# Patient Record
Sex: FEMALE | Race: WHITE | NOT HISPANIC OR LATINO | URBAN - METROPOLITAN AREA
[De-identification: names, ages, dates, MRNs, and addresses within clinical notes are randomized per-mention and may not be internally consistent; named-entity substitution may affect disease eponyms.]

---

## 2017-07-25 ENCOUNTER — OUTPATIENT (OUTPATIENT)
Dept: OUTPATIENT SERVICES | Facility: HOSPITAL | Age: 77
LOS: 1 days | End: 2017-07-25
Payer: MEDICARE

## 2017-07-25 PROCEDURE — 77080 DXA BONE DENSITY AXIAL: CPT | Mod: 26

## 2017-07-25 PROCEDURE — G0202: CPT | Mod: 26

## 2017-07-28 ENCOUNTER — EMERGENCY (EMERGENCY)
Facility: HOSPITAL | Age: 77
LOS: 1 days | End: 2017-07-28
Payer: MEDICARE

## 2017-07-28 PROCEDURE — 71020: CPT | Mod: 26

## 2017-07-28 PROCEDURE — 99284 EMERGENCY DEPT VISIT MOD MDM: CPT

## 2017-08-05 ENCOUNTER — TRANSCRIPTION ENCOUNTER (OUTPATIENT)
Age: 77
End: 2017-08-05

## 2017-12-09 ENCOUNTER — TRANSCRIPTION ENCOUNTER (OUTPATIENT)
Age: 77
End: 2017-12-09

## 2018-12-03 ENCOUNTER — TRANSCRIPTION ENCOUNTER (OUTPATIENT)
Age: 78
End: 2018-12-03

## 2019-05-29 ENCOUNTER — OUTPATIENT (OUTPATIENT)
Dept: OUTPATIENT SERVICES | Facility: HOSPITAL | Age: 79
LOS: 1 days | End: 2019-05-29
Payer: MEDICARE

## 2019-05-29 PROCEDURE — 71046 X-RAY EXAM CHEST 2 VIEWS: CPT | Mod: 26

## 2019-08-24 ENCOUNTER — APPOINTMENT (OUTPATIENT)
Dept: MAMMOGRAPHY | Facility: CLINIC | Age: 79
End: 2019-08-24
Payer: MEDICARE

## 2019-08-24 PROCEDURE — 77063 BREAST TOMOSYNTHESIS BI: CPT

## 2019-08-24 PROCEDURE — 77067 SCR MAMMO BI INCL CAD: CPT

## 2019-09-20 ENCOUNTER — APPOINTMENT (OUTPATIENT)
Dept: RADIOLOGY | Facility: CLINIC | Age: 79
End: 2019-09-20
Payer: MEDICARE

## 2019-09-20 PROCEDURE — 73120 X-RAY EXAM OF HAND: CPT | Mod: 50

## 2020-10-05 ENCOUNTER — APPOINTMENT (OUTPATIENT)
Dept: RADIOLOGY | Facility: CLINIC | Age: 80
End: 2020-10-05

## 2020-10-05 ENCOUNTER — APPOINTMENT (OUTPATIENT)
Dept: MAMMOGRAPHY | Facility: CLINIC | Age: 80
End: 2020-10-05
Payer: MEDICARE

## 2020-10-05 PROCEDURE — 77067 SCR MAMMO BI INCL CAD: CPT

## 2020-10-05 PROCEDURE — 77063 BREAST TOMOSYNTHESIS BI: CPT

## 2020-10-05 PROCEDURE — 77080 DXA BONE DENSITY AXIAL: CPT

## 2021-05-12 ENCOUNTER — APPOINTMENT (OUTPATIENT)
Dept: RADIOLOGY | Facility: CLINIC | Age: 81
End: 2021-05-12
Payer: MEDICARE

## 2021-05-12 PROCEDURE — 71100 X-RAY EXAM RIBS UNI 2 VIEWS: CPT | Mod: RT

## 2021-05-12 PROCEDURE — 71046 X-RAY EXAM CHEST 2 VIEWS: CPT

## 2021-08-19 ENCOUNTER — APPOINTMENT (OUTPATIENT)
Dept: MAMMOGRAPHY | Facility: CLINIC | Age: 81
End: 2021-08-19

## 2021-08-19 ENCOUNTER — APPOINTMENT (OUTPATIENT)
Dept: ULTRASOUND IMAGING | Facility: CLINIC | Age: 81
End: 2021-08-19

## 2021-08-20 ENCOUNTER — APPOINTMENT (OUTPATIENT)
Dept: BREAST CENTER | Facility: CLINIC | Age: 81
End: 2021-08-20
Payer: MEDICARE

## 2021-08-20 VITALS
WEIGHT: 160 LBS | DIASTOLIC BLOOD PRESSURE: 81 MMHG | SYSTOLIC BLOOD PRESSURE: 131 MMHG | HEART RATE: 78 BPM | TEMPERATURE: 97.5 F | BODY MASS INDEX: 25.11 KG/M2 | HEIGHT: 67 IN

## 2021-08-20 DIAGNOSIS — R93.89 ABNORMAL FINDINGS ON DIAGNOSTIC IMAGING OF OTHER SPECIFIED BODY STRUCTURES: ICD-10-CM

## 2021-08-20 DIAGNOSIS — Z87.891 PERSONAL HISTORY OF NICOTINE DEPENDENCE: ICD-10-CM

## 2021-08-20 DIAGNOSIS — Z78.9 OTHER SPECIFIED HEALTH STATUS: ICD-10-CM

## 2021-08-20 DIAGNOSIS — Z86.39 PERSONAL HISTORY OF OTHER ENDOCRINE, NUTRITIONAL AND METABOLIC DISEASE: ICD-10-CM

## 2021-08-20 PROCEDURE — 99203 OFFICE O/P NEW LOW 30 MIN: CPT

## 2021-08-20 RX ORDER — CHROMIUM 200 MCG
TABLET ORAL
Refills: 0 | Status: ACTIVE | COMMUNITY

## 2021-08-20 RX ORDER — LACTOBACILLUS ACIDOPHILUS/PECT 30 MG-20MG
TABLET ORAL
Refills: 0 | Status: ACTIVE | COMMUNITY

## 2021-08-20 RX ORDER — PNV NO.95/FERROUS FUM/FOLIC AC 28MG-0.8MG
TABLET ORAL
Refills: 0 | Status: ACTIVE | COMMUNITY

## 2021-08-20 NOTE — HISTORY OF PRESENT ILLNESS
[FreeTextEntry1] : Pt is a 81 year old white female referred for consultation by Wero Figueroa MD for L breast pink/bloody dc. Pt noticed the discharge 1 week ago. Pt states that her nipple was very itchy and started scratching it and that’s when it started to bleed. \par Pt denies any breast lesions or masses. \par \par 8/16/21, ZP, Bilat dMMG: scattered FG density, no susp findings, R subareolar 4mm nodular density, bilat benign breast calcs noted, L u/s bx rec, BR4\par 8/16/21, ZP, L U/S: 2:00 6N 1.1cm well-circumscribed hypoechoic nodule, RA duct ectasia containing a 7mm isoechoic soft tissue nodule, L u/s bx and R U/S rec, BR4\par 8/17/21, ZP, R U/S: SA nodule may rep focal duct ectasia, rec R mmg and u/s in 6 mos, BR3\par \par Fhx: mAunt w/ Lung Ca at 76, pAunt w/ Breast at 36, mUncle w/ Kidney Ca at 76, and mGrandma w/ Colon Ca at 88. \par CBE: Right negative, Left breast small abrasion from band aid and left breast single duct bloody nipple d/c, guiac positive, no axillary or SC lymphadenopathy.

## 2021-08-20 NOTE — PAST MEDICAL HISTORY
[Menarche Age ____] : age at menarche was [unfilled] [Menopause Age____] : age at menopause was [unfilled] [History of Hormone Replacement Treatment] : has a history of hormone replacement treatment [Total Preg ___] : G[unfilled] [Live Births ___] : P[unfilled]  [Living ___] : Living: [unfilled] [Age At Live Birth ___] : Age at live birth: [unfilled] [FreeTextEntry7] : yes, around 28 years [FreeTextEntry8] : no

## 2021-08-20 NOTE — DATA REVIEWED
[FreeTextEntry1] : 8/16/21, ZP, Bilat dMMG: scattered FG density, no susp findings, R subareolar 4mm nodular density, bilat benign breast calcs noted, L u/s bx rec, BR4\par 8/16/21, ZP, L U/S: 2:00 6N 1.1cm well-circumscribed hypoechoic nodule, RA duct ectasia containing a 7mm isoechoic soft tissue nodule, L u/s bx and R U/S rec, BR4\par 8/17/21, ZP, R U/S: SA nodule may rep focal duct ectasia, rec R mmg and u/s in 6 mos, BR3

## 2021-08-20 NOTE — CONSULT LETTER
[Dear  ___] : Dear  [unfilled], [Consult Letter:] : I had the pleasure of evaluating your patient, [unfilled]. [Please see my note below.] : Please see my note below. [Consult Closing:] : Thank you very much for allowing me to participate in the care of this patient.  If you have any questions, please do not hesitate to contact me. [Sincerely,] : Sincerely, [FreeTextEntry3] : Olivia Rudd MD

## 2021-08-20 NOTE — ASSESSMENT
[FreeTextEntry1] : Pt is a 81 year old female referred for consultation by Wero Figueroa MD for L breast pink/bloody dc. Pt noticed the discharge 1 week ago. Pt states that her nipple was very itchy and started scratching it and that’s when it started to bleed. \par Pt denies any breast lesions, discharge or masses. \par \par 8/16/21, ZP, Bilat dMMG: scattered FG density, no susp findings, R subareolar 4mm nodular density, bilat benign breast calcs noted, L u/s bx rec, BR4\par 8/16/21, ZP, L U/S: 2:00 6N 1.1cm well-circumscribed hypoechoic nodule, RA duct ectasia containing a 7mm isoechoic soft tissue nodule, L u/s bx, BR4\par 8/17/21, ZP, R U/S: SA nodule may rep focal duct ectasia, rec R mmg and u/s in 6 mos, BR3\par \par Fhx: mAunt w/ Lung Ca at 76, pAunt w/ Breast at 36, mUncle w/ Kidney Ca at 76, and mGrandma w/ Colon Ca at 88. \par CBE: Right negative, Left breast small abrasion from band aid and left breast single duct bloody nipple d/c, guiac positive, no axillary or SC lymphadenopathy.\par Reviewed with pt studies. Rec left breast u/s core bx and right 6 mos f.u u/s rec.  Also left breast bloody nipple and possible etiologies including papilloma, papillary carcinoma, and may correlate with the left u/s finding. If left biopsy is benign, would still consider left NAC exp and excision if bloody d/c persists. Plan for surgery pending results of the biopsy. Pt to f.u after the biopsy. Also she has a PACEMAKER and cannot have a MRI.\par RIght side rec for 6 mos f.u u/s but will also review with NW.  \par

## 2021-08-20 NOTE — PHYSICAL EXAM
[Bra Size: ___] : Bra Size: [unfilled] [Normocephalic] : normocephalic [Atraumatic] : atraumatic [Supple] : supple [No Supraclavicular Adenopathy] : no supraclavicular adenopathy [No Thyromegaly] : no thyromegaly [Examined in the supine and seated position] : examined in the supine and seated position [No dominant masses] : no dominant masses in right breast  [No dominant masses] : no dominant masses left breast [No Nipple Retraction] : no left nipple retraction [No Nipple Discharge] : no right nipple discharge [No Axillary Lymphadenopathy] : no left axillary lymphadenopathy [No Edema] : no edema [No Swelling] : no swelling [Full ROM] : full range of motion [No Rashes] : no rashes [No Ulceration] : no ulceration [de-identified] : left breast single duct bloody d/c.  High axillary tail pacemaker.

## 2021-08-23 ENCOUNTER — NON-APPOINTMENT (OUTPATIENT)
Age: 81
End: 2021-08-23

## 2021-08-27 ENCOUNTER — NON-APPOINTMENT (OUTPATIENT)
Age: 81
End: 2021-08-27

## 2021-08-31 ENCOUNTER — NON-APPOINTMENT (OUTPATIENT)
Age: 81
End: 2021-08-31

## 2021-09-02 ENCOUNTER — APPOINTMENT (OUTPATIENT)
Dept: ULTRASOUND IMAGING | Facility: CLINIC | Age: 81
End: 2021-09-02
Payer: MEDICARE

## 2021-09-02 ENCOUNTER — RESULT REVIEW (OUTPATIENT)
Age: 81
End: 2021-09-02

## 2021-09-02 PROCEDURE — 77065 DX MAMMO INCL CAD UNI: CPT | Mod: LT

## 2021-09-02 PROCEDURE — 19084Z: CUSTOM

## 2021-09-02 PROCEDURE — 19083 BX BREAST 1ST LESION US IMAG: CPT | Mod: LT

## 2021-09-02 PROCEDURE — A4648: CPT | Mod: NC

## 2021-09-08 ENCOUNTER — NON-APPOINTMENT (OUTPATIENT)
Age: 81
End: 2021-09-08

## 2021-09-09 ENCOUNTER — NON-APPOINTMENT (OUTPATIENT)
Age: 81
End: 2021-09-09

## 2021-09-13 ENCOUNTER — APPOINTMENT (OUTPATIENT)
Dept: BREAST CENTER | Facility: CLINIC | Age: 81
End: 2021-09-13
Payer: MEDICARE

## 2021-09-13 VITALS
HEIGHT: 67 IN | BODY MASS INDEX: 25.11 KG/M2 | WEIGHT: 160 LBS | HEART RATE: 87 BPM | TEMPERATURE: 97.3 F | SYSTOLIC BLOOD PRESSURE: 131 MMHG | DIASTOLIC BLOOD PRESSURE: 71 MMHG

## 2021-09-13 PROCEDURE — 99215 OFFICE O/P EST HI 40 MIN: CPT

## 2021-09-13 NOTE — CONSULT LETTER
[Dear  ___] : Dear  [unfilled], [Courtesy Letter:] : I had the pleasure of seeing your patient, [unfilled], in my office today. [Please see my note below.] : Please see my note below. [Consult Closing:] : Thank you very much for allowing me to participate in the care of this patient.  If you have any questions, please do not hesitate to contact me. [Sincerely,] : Sincerely, [FreeTextEntry3] : Olivia Rudd MD

## 2021-09-13 NOTE — DATA REVIEWED
[FreeTextEntry1] : 8/16/21, ZP, Bilat dMMG: scattered FG density, no susp findings, R subareolar 4mm nodular density, bilat benign breast calcs noted, L u/s bx rec, BR4\par 8/16/21, ZP, L U/S: 2:00 6N 1.1cm well-circumscribed hypoechoic nodule, RA duct ectasia containing a 7mm isoechoic soft tissue nodule, L u/s bx, BR4\par 8/17/21, ZP, R U/S: SA nodule may rep focal duct ectasia, rec R mmg and u/s in 6 mos, BR3\par 9/2/21, L U/S bx 12mm nodule 2:00 PATH: Focal stromal fibrosis, benign and concordant, clinical f/u is rec for bloody nipple discharge\par L U/S bx 6mm RA nodule PATH: Duct ectasia w/ periductal fibrosis, benign and concordant, clinical f/u is rec for bloody nipple discharge\par

## 2021-09-13 NOTE — PHYSICAL EXAM
[Normocephalic] : normocephalic [Atraumatic] : atraumatic [Supple] : supple [No Supraclavicular Adenopathy] : no supraclavicular adenopathy [No Thyromegaly] : no thyromegaly [Examined in the supine and seated position] : examined in the supine and seated position [No dominant masses] : no dominant masses in right breast  [No dominant masses] : no dominant masses left breast [No Nipple Retraction] : no left nipple retraction [No Nipple Discharge] : no left nipple discharge [No Axillary Lymphadenopathy] : no left axillary lymphadenopathy [No Edema] : no edema [No Swelling] : no swelling [Full ROM] : full range of motion [No Rashes] : no rashes [No Ulceration] : no ulceration [Bra Size: ___] : Bra Size: [unfilled] [de-identified] : R>L overall.  [de-identified] : Left breast ecchymosis s/p bx x2, persistent left breast single duct bloody nipple d/c upon expression

## 2021-09-13 NOTE — ASSESSMENT
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 for L breast pink/bloody dc. Pt has not had any spontaneous nipple discharge since last visit.  Here with her daughter, Carolee. \par \par Referred by Wero Figueroa MD.\par \par 8/16/21, ZP, Bilat dMMG: scattered FG density, no susp findings, R subareolar 4mm nodular density, bilat benign breast calcs noted, L u/s bx rec, BR4\par 8/16/21, ZP, L U/S: 2:00 6N 1.1cm well-circumscribed hypoechoic nodule, RA duct ectasia containing a 7mm isoechoic soft tissue nodule, L u/s bx, BR4\par 8/17/21, ZP, R U/S: SA nodule may rep focal duct ectasia, rec R mmg and u/s in 6 mos, BR3\par 9/2/21, L U/S bx 12mm nodule 2:00 PATH: Focal stromal fibrosis, benign and concordant, clinical f/u is rec for bloody nipple discharge\par L U/S bx 6mm RA nodule PATH: Duct ectasia w/ periductal fibrosis, benign and concordant, clinical f/u is rec for bloody nipple discharge\par \par Fhx: mAunt w/ Lung Ca at 76, pAunt w/ Breast at 36, mUncle w/ Kidney Ca at 76, and mGrandma w/ Colon Ca at 88. \par CBE: Right neg, Left ecchymosis from biopsy and and persistent single duct bloody nipple d/c. No adenopathy.\par Long discussion with pt and daughter. Left breast biopsies x2 benign and concordant but no evidence of papilloma on either biopsy. Also still has persistent single duct bloody nipple d/c.  \par Also [facility]AD showed BRCA2+ and pt has call into genetic counseling. Reviewed risk of breast cancer 43-84% up to age 70.  Discussed pt is 81 and risk of breast cancer also increases with age.  Screening for BRCA 2+ pts is mmg and u/s alternating with MRI usually unless prophylactic mastectomies.  Limited data for breast cancer risk and screening for >70's y/o.\par Rec is for MRI if doable. Pt will need a letter from cardiologist.  If MRI is done, then further management pending results.\par If MRI is negative or not doable, then options are: NAC exp and excision with further management pending results including additional surgery or given BRCA 2+, prophylactic mastectomies with or without recon. Will discuss need for SLN given age with medical oncologist prior to proceeding. \par Would rec appt with plastic surgeon if this option is a consideration and thinking about reconstruction. \par  BRCA 2+ also incurs risk for ovarian cancer, rec appt with gyn and also melanoma, pt thinks has had in the past. \par Pt states that she is able to get MRI with her pacemaker type.  \par

## 2021-09-13 NOTE — HISTORY OF PRESENT ILLNESS
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 Pt has not had any spontaneous nipple discharge since last visit.  Here with her daughter, Carolee. \par \par Referred by Wero Figueroa MD.\par \par 8/16/21, ZP, Bilat dMMG: scattered FG density, no susp findings, R subareolar 4mm nodular density, bilat benign breast calcs noted, L u/s bx rec, BR4\par 8/16/21, ZP, L U/S: 2:00 6N 1.1cm well-circumscribed hypoechoic nodule, RA duct ectasia containing a 7mm isoechoic soft tissue nodule, L u/s bx, BR4\par 8/17/21, ZP, R U/S: SA nodule may rep focal duct ectasia, rec R mmg and u/s in 6 mos, BR3\par \par 9/2/21, L U/S bx 12mm nodule 2:00 PATH: Focal stromal fibrosis, benign and concordant, clinical f/u is rec for bloody nipple discharge\par L U/S bx 6mm RA nodule PATH: Duct ectasia w/ periductal fibrosis, benign and concordant, clinical f/u is rec for bloody nipple discharge\par \par Fhx: mAunt w/ Lung Ca at 76, pAunt w/ Breast at 36, mUncle w/ Kidney Ca at 76, and mGrandma w/ Colon Ca at 88. \par \par  \par \par 
no...

## 2021-09-20 ENCOUNTER — NON-APPOINTMENT (OUTPATIENT)
Age: 81
End: 2021-09-20

## 2021-09-21 ENCOUNTER — APPOINTMENT (OUTPATIENT)
Dept: OBGYN | Facility: CLINIC | Age: 81
End: 2021-09-21
Payer: MEDICARE

## 2021-09-21 VITALS
HEIGHT: 67 IN | BODY MASS INDEX: 25.11 KG/M2 | WEIGHT: 160 LBS | SYSTOLIC BLOOD PRESSURE: 122 MMHG | DIASTOLIC BLOOD PRESSURE: 74 MMHG

## 2021-09-21 DIAGNOSIS — R92.8 OTHER ABNORMAL AND INCONCLUSIVE FINDINGS ON DIAGNOSTIC IMAGING OF BREAST: ICD-10-CM

## 2021-09-21 DIAGNOSIS — Z15.01 GENETIC SUSCEPTIBILITY TO MALIGNANT NEOPLASM OF BREAST: ICD-10-CM

## 2021-09-21 DIAGNOSIS — Z87.2 PERSONAL HISTORY OF DISEASES OF THE SKIN AND SUBCUTANEOUS TISSUE: ICD-10-CM

## 2021-09-21 DIAGNOSIS — Z87.39 PERSONAL HISTORY OF OTHER DISEASES OF THE MUSCULOSKELETAL SYSTEM AND CONNECTIVE TISSUE: ICD-10-CM

## 2021-09-21 DIAGNOSIS — Z15.09 GENETIC SUSCEPTIBILITY TO MALIGNANT NEOPLASM OF BREAST: ICD-10-CM

## 2021-09-21 PROCEDURE — 99387 INIT PM E/M NEW PAT 65+ YRS: CPT

## 2021-09-21 PROCEDURE — 99202 OFFICE O/P NEW SF 15 MIN: CPT | Mod: 25

## 2021-09-21 RX ORDER — COLD-HOT PACK
EACH MISCELLANEOUS
Refills: 0 | Status: ACTIVE | COMMUNITY

## 2021-09-21 RX ORDER — ALPRAZOLAM 0.25 MG/1
0.25 TABLET ORAL
Qty: 90 | Refills: 0 | Status: ACTIVE | COMMUNITY
Start: 2021-08-27

## 2021-09-21 NOTE — HISTORY OF PRESENT ILLNESS
[Patient reported mammogram was abnormal] : Patient reported mammogram was abnormal [Patient reported bone density results were abnormal] : Patient reported bone density results were abnormal [Patient reported colonoscopy was normal] : Patient reported colonoscopy was normal [FreeTextEntry1] : 82yo  with ductal ectasia and BRCA2 + followed by Dr Rudd is here for GYN intake.   [Mammogramdate] : 2021 [TextBox_19] : ductal ectasia [PapTaraeardate] : 10yrs ago  [TextBox_31] : denies abnl paps [BoneDensityDate] : 2 yrs ago  [TextBox_37] : osteopenia  [ColonoscopyDate] : 2 yrs ago  [TextBox_43] : per pateint report

## 2021-09-21 NOTE — PHYSICAL EXAM
[Appropriately responsive] : appropriately responsive [Alert] : alert [No Acute Distress] : no acute distress [No Lymphadenopathy] : no lymphadenopathy [Regular Rate Rhythm] : regular rate rhythm [No Murmurs] : no murmurs [Clear to Auscultation B/L] : clear to auscultation bilaterally [Soft] : soft [Non-tender] : non-tender [Non-distended] : non-distended [No HSM] : No HSM [No Lesions] : no lesions [No Mass] : no mass [Oriented x3] : oriented x3 [FreeTextEntry6] : Pt has bruising on her L breast from her bx.  [Examination Of The Breasts] : a normal appearance [___] : a [unfilled] ~Ucm area of erythema [No Masses] : no breast masses were palpable [Labia Majora] : normal [Labia Minora] : normal [Normal] : normal [Uterine Adnexae] : normal [FreeTextEntry9] : Neg

## 2021-09-21 NOTE — DISCUSSION/SUMMARY
[FreeTextEntry1] : 80yo P2 with ductal ectasia and BRCA2 + followed by Dr Rudd \par \par BRCA 2+: We discussed increased r/o breast and ovarian ca and offered pt ppx oophorectomy. Pt would like to have close follow up for now \par - RTO ASAP for TVUS\par - q 6 month pelvic exam \par - Will address surgery with her at each viist \par - Daughter is BRCA tested and neg 10 yrs ago but son is not. Recommended they both get tested\par \par Vag atrophy \par - Pt to follow up with Dr Rudd re: restarting vaginal E2 for recurrent UTIs \par \par RHM: \par - DVS neg x 3\par - Dentist, PCP, Derm as discussed \par - Rx given for DEXA\par - Colonoscopy as discussed -- due in 3 yrs \par - Encouraged healthy diet, exercise, weight maint. \par \par Ann Camacho MD \par Obstetrician/Gynecologist\par \par

## 2021-09-21 NOTE — COUNSELING
[Nutrition/ Exercise/ Weight Management] : nutrition, exercise, weight management [Drugs/Alcohol] : drugs, alcohol [Intimate Partner Violence] : intimate partner violence [STD (testing, results, tx)] : STD (testing, results, tx)

## 2021-09-27 ENCOUNTER — NON-APPOINTMENT (OUTPATIENT)
Age: 81
End: 2021-09-27

## 2021-10-06 ENCOUNTER — OUTPATIENT (OUTPATIENT)
Dept: OUTPATIENT SERVICES | Facility: HOSPITAL | Age: 81
LOS: 1 days | End: 2021-10-06
Payer: MEDICARE

## 2021-10-06 ENCOUNTER — APPOINTMENT (OUTPATIENT)
Dept: MRI IMAGING | Facility: HOSPITAL | Age: 81
End: 2021-10-06

## 2021-10-06 DIAGNOSIS — N64.52 NIPPLE DISCHARGE: ICD-10-CM

## 2021-10-06 DIAGNOSIS — Z15.01 GENETIC SUSCEPTIBILITY TO MALIGNANT NEOPLASM OF BREAST: ICD-10-CM

## 2021-10-06 LAB
CYTOLOGY CVX/VAG DOC THIN PREP: NORMAL
HPV HIGH+LOW RISK DNA PNL CVX: NOT DETECTED

## 2021-10-06 PROCEDURE — A9585: CPT

## 2021-10-06 PROCEDURE — 71046 X-RAY EXAM CHEST 2 VIEWS: CPT | Mod: 26

## 2021-10-06 PROCEDURE — 77049 MRI BREAST C-+ W/CAD BI: CPT | Mod: 26

## 2021-10-06 PROCEDURE — C8908: CPT

## 2021-10-06 PROCEDURE — 71046 X-RAY EXAM CHEST 2 VIEWS: CPT

## 2021-10-06 PROCEDURE — C8937: CPT

## 2021-10-08 ENCOUNTER — NON-APPOINTMENT (OUTPATIENT)
Age: 81
End: 2021-10-08

## 2021-10-13 ENCOUNTER — APPOINTMENT (OUTPATIENT)
Dept: BREAST CENTER | Facility: CLINIC | Age: 81
End: 2021-10-13
Payer: MEDICARE

## 2021-10-13 VITALS
DIASTOLIC BLOOD PRESSURE: 97 MMHG | WEIGHT: 160 LBS | HEIGHT: 67 IN | BODY MASS INDEX: 25.11 KG/M2 | SYSTOLIC BLOOD PRESSURE: 167 MMHG | HEART RATE: 81 BPM

## 2021-10-13 DIAGNOSIS — Z72.3 LACK OF PHYSICAL EXERCISE: ICD-10-CM

## 2021-10-13 DIAGNOSIS — Z80.3 FAMILY HISTORY OF MALIGNANT NEOPLASM OF BREAST: ICD-10-CM

## 2021-10-13 DIAGNOSIS — Z80.1 FAMILY HISTORY OF MALIGNANT NEOPLASM OF TRACHEA, BRONCHUS AND LUNG: ICD-10-CM

## 2021-10-13 DIAGNOSIS — Z80.0 FAMILY HISTORY OF MALIGNANT NEOPLASM OF DIGESTIVE ORGANS: ICD-10-CM

## 2021-10-13 DIAGNOSIS — Z87.891 PERSONAL HISTORY OF NICOTINE DEPENDENCE: ICD-10-CM

## 2021-10-13 DIAGNOSIS — Z80.51 FAMILY HISTORY OF MALIGNANT NEOPLASM OF KIDNEY: ICD-10-CM

## 2021-10-13 DIAGNOSIS — Z81.8 FAMILY HISTORY OF OTHER MENTAL AND BEHAVIORAL DISORDERS: ICD-10-CM

## 2021-10-13 PROCEDURE — 99417 PROLNG OP E/M EACH 15 MIN: CPT

## 2021-10-13 PROCEDURE — 99205 OFFICE O/P NEW HI 60 MIN: CPT

## 2021-10-13 PROCEDURE — G2212 PROLONG OUTPT/OFFICE VIS: CPT

## 2021-10-13 RX ORDER — ATORVASTATIN CALCIUM 10 MG/1
10 TABLET, FILM COATED ORAL
Refills: 0 | Status: ACTIVE | COMMUNITY

## 2021-10-13 RX ORDER — METOPROLOL SUCCINATE 25 MG/1
25 TABLET, EXTENDED RELEASE ORAL
Refills: 0 | Status: ACTIVE | COMMUNITY

## 2021-10-13 RX ORDER — MAGNESIUM OXIDE/MAG AA CHELATE 300 MG
CAPSULE ORAL
Refills: 0 | Status: ACTIVE | COMMUNITY

## 2021-10-13 RX ORDER — MV,IRON,MINS/FOLIC ACID/BIOTIN 66.7 MCG
CAPSULE ORAL
Refills: 0 | Status: ACTIVE | COMMUNITY

## 2021-10-13 NOTE — HISTORY OF PRESENT ILLNESS
[FreeTextEntry1] : This is an 81 year old  female who had an episode of itching of her left nipple.  She scratched and saw bleeding.  She saw Dr. Rudd and it was found that she had bloody nipple discharge from a single duct.  She had a workup with a mammogram and ultrasound.  Two lesions were seen on the left ultrasound.  Biopsies were benign.  She then had an MRI that was normal. The patient has not seen any further bleeding herself.\par \par Dr. Rudd also sent of genetic testing since there was a family history of breast cancer at age 34 in a paternal aunt.  The patient was found to carry a BRCA2 mutation.  \par \par She has been advised to have either a left breast biopsy or bilateral mastectomies.  She was sent to Dr. Duran for the ovarian cancer risk and has a pelvic ultrasound scheduled.\par \par She had a benign needle biopsy of one of her breasts 40 years ago.\par \par She is here for an opinion because I took care of her neighbor.

## 2021-10-13 NOTE — PHYSICAL EXAM
[EOMI] : extra ocular movement intact [Sclera nonicteric] : sclera nonicteric [Supple] : supple [No Supraclavicular Adenopathy] : no supraclavicular adenopathy [No Cervical Adenopathy] : no cervical adenopathy [No Thyromegaly] : no thyromegaly [Clear to Auscultation Bilat] : clear to auscultation bilaterally [Normal Sinus Rhythm] : normal sinus rhythm [Normal S1, S2] : normal S1 and S2 [Examined in the supine and seated position] : examined in the supine and seated position [No dominant masses] : no dominant masses in right breast  [No dominant masses] : no dominant masses left breast [No Nipple Retraction] : no left nipple retraction [No Nipple Discharge] : no left nipple discharge [No Axillary Lymphadenopathy] : no left axillary lymphadenopathy [Soft] : abdomen soft [Not Tender] : non-tender [No Palpable Masses] : no abdominal mass palpated [de-identified] : Pacemaker left [de-identified] : No discharge could be ellicited

## 2021-10-13 NOTE — CONSULT LETTER
[Dear  ___] : Dear  [unfilled], [Consult Letter:] : I had the pleasure of evaluating your patient, [unfilled]. [DrAndrew  ___] : Dr. TORRES

## 2021-10-13 NOTE — DATA REVIEWED
[FreeTextEntry1] : Bilateral mammogram 8/16/2021:  Right 4 mm subareolar density.\par \par Left breast ultrasound 8/16/2021:  2:00 N6 1.1 cm well circumscribed hypoechoic nodule.  retroareolar duct ectasia containing 7 mm isoechoic nodule.  Biopsies advised.\par \par Right breast ultrasound 8/17/2021:  Subareolar nodule may represent a focal duct ectasia.  Follow-up in 6 months.\par \par Pathology 9/2/2021:  Left 2N6= benign fibroadipose tissue with focal stromal fibrosis\par                                   Left RA= benign breast tissue showing duct ectasia with periductal fibrosis\par \par Bilateral breast MRI 10/6/2021:  No MRI evidence of malignancy. Bilaterally mildly dilated retroareolar ducts with inspissated debris.\par \par (Images reviewed on PACS.)

## 2021-10-13 NOTE — PAST MEDICAL HISTORY
[Postmenopausal] : The patient is postmenopausal [Menarche Age ____] : age at menarche was [unfilled] [Total Preg ___] : G[unfilled] [Live Births ___] : P[unfilled]  [Age At Live Birth ___] : Age at live birth: [unfilled] [FreeTextEntry2] : daughter, son

## 2021-10-18 ENCOUNTER — APPOINTMENT (OUTPATIENT)
Dept: BREAST CENTER | Facility: CLINIC | Age: 81
End: 2021-10-18
Payer: MEDICARE

## 2021-10-18 VITALS
WEIGHT: 160 LBS | BODY MASS INDEX: 25.11 KG/M2 | TEMPERATURE: 97.6 F | DIASTOLIC BLOOD PRESSURE: 94 MMHG | HEIGHT: 67 IN | SYSTOLIC BLOOD PRESSURE: 166 MMHG | HEART RATE: 80 BPM

## 2021-10-18 DIAGNOSIS — Z80.3 FAMILY HISTORY OF MALIGNANT NEOPLASM OF BREAST: ICD-10-CM

## 2021-10-18 PROCEDURE — 99215 OFFICE O/P EST HI 40 MIN: CPT

## 2021-10-18 NOTE — ASSESSMENT
[FreeTextEntry1] : Pt is a 81 year old white BRCA2+ female here for f/u and further discussion after biopsies and MRI for L breast pink/bloody dc. Pt has not had any spontaneous nipple discharge since last visit. \par Here with her daughter, Carolee and Granddaughter Lillian. \par Pt had second opinion with Dr. Griffin and is here to discuss further management. \par \par Referred by Wero Figueroa MD.\par \par 8/16/21, ZP, Bilat dMMG: scattered FG density, no susp findings, R subareolar 4mm nodular density, bilat benign breast calcs noted, L u/s bx rec, BR4\par 8/16/21, ZP, L U/S: 2:00 6N 1.1cm well-circumscribed hypoechoic nodule, RA duct ectasia containing a 7mm isoechoic soft tissue nodule, L u/s bx, BR4\par 8/17/21, ZP, R U/S: SA nodule may rep focal duct ectasia, rec R mmg and u/s in 6 mos, BR3\par 9/2/21, L U/S bx 12mm nodule 2:00 PATH: Focal stromal fibrosis, benign and concordant, clinical f/u is rec for bloody nipple discharge\par L U/S bx 6mm RA nodule PATH: Duct ectasia w/ periductal fibrosis, benign and concordant, clinical f/u is rec for bloody nipple discharge\par 10/6/21, [facility], MRI: bilaterally - multiple mildly dilated RA ducts containing most c/w inspissated/proteinaceous debris within the ducts, few scattered enhancing nonspecific foci bilat, no susp enhancement bilat, L CW pacemaker, no axillary or internal mammary lymphadenopathy, no MRI evidence of malignancy, further mgmt for the pt's L bloody d/c must be on a clinical basis, rec annual mmg on schedule, BR2.\par \par Fhx: mAunt w/ Lung Ca at 76, pAunt w/ Breast at 36, mUncle w/ Kidney Ca at 76, and mGrandma w/ Colon Ca at 88. \par CBE: Right neg, Left single duct bloody nipple d/c. No adenopathy. Full ROM and no lymphedema.\par Reviewed results of biopsy and studies. Discussed her issues:  Left bloody nipple d/c, would rec NAC exp and excision with or without mastectomy (for BRCA2+).  Further management pending results, if malignancy is found, may need delayed additional surgery, including re-excision, possible LN surgery. \par Also discussed BRCA2+, options would be alternating mmg/u/s with MRI and CBE q 6 mos vs bilat mastectomy with or without recon. Discussed if mastectomy is done, would place magtrace and pending results may determine if SLNBx is needed. \par \par Right breast, no suspicious findings but rec was for Right f.u mmg and u/s 2/22.  Would not need if pt had mastectomies but would place magtrace. \par Pt does not want mastectomy and opts for NAC exploration and exc. She also has an appt with Dr. Duran to discuss the BSO. She has appt for pelvic u/s. \par Pt will see Dr Duran and contact us for possible coordination of the breast and gyn surgeries. Reviewed risk and complications, including but not limited to infection, discussed my part is outpt, pain control, f.u etc. \par \par

## 2021-10-18 NOTE — PHYSICAL EXAM
[Normocephalic] : normocephalic [Atraumatic] : atraumatic [Supple] : supple [No Supraclavicular Adenopathy] : no supraclavicular adenopathy [No Thyromegaly] : no thyromegaly [Examined in the supine and seated position] : examined in the supine and seated position [No dominant masses] : no dominant masses in right breast  [No dominant masses] : no dominant masses left breast [No Nipple Discharge] : no right nipple discharge [No Axillary Lymphadenopathy] : no left axillary lymphadenopathy [No Edema] : no edema [No Swelling] : no swelling [Full ROM] : full range of motion [No Rashes] : no rashes [No Ulceration] : no ulceration [Symmetrical] : symmetrical [Bra Size: ___] : Bra Size: [unfilled] [No Nipple Retraction] : no left nipple retraction [de-identified] : dark blood single duct bloody d/c with expression.

## 2021-10-18 NOTE — HISTORY OF PRESENT ILLNESS
[FreeTextEntry1] : Pt is a 81 year old white BRCA2+ female here for f/u and further discussion after biopsies and MRI for L breast pink/bloody dc. Pt has not had any spontaneous nipple discharge since last visit. \par Here with her daughter, Carolee and Granddaughter Lillian. \par Pt had second opinion with Dr. Griffin and is here to discuss further management. \par \par Referred by Wero Figueroa MD.\par \par 8/16/21, ZP, Bilat dMMG: scattered FG density, no susp findings, R subareolar 4mm nodular density, bilat benign breast calcs noted, L u/s bx rec, BR4\par 8/16/21, ZP, L U/S: 2:00 6N 1.1cm well-circumscribed hypoechoic nodule, RA duct ectasia containing a 7mm isoechoic soft tissue nodule, L u/s bx, BR4\par 8/17/21, ZP, R U/S: SA nodule may rep focal duct ectasia, rec R mmg and u/s in 6 mos, BR3\par 9/2/21, L U/S bx 12mm nodule 2:00 PATH: Focal stromal fibrosis, benign and concordant, clinical f/u is rec for bloody nipple discharge\par L U/S bx 6mm RA nodule PATH: Duct ectasia w/ periductal fibrosis, benign and concordant, clinical f/u is rec for bloody nipple discharge\par 10/6/21, [facility], MRI: bilaterally - multiple mildly dilated RA ducts containing most c/w inspissated/proteinaceous debris within the ducts, few scattered enhancing nonspecific foci bilat, no susp enhancement bilat, L CW pacemaker, no axillary or internal mammary lymphadenopathy, no MRI evidence of malignancy, further mgmt for the pt's L bloody d/c must be on a clinical basis, rec annual mmg on schedule, BR2.\par \par Fhx: mAunt w/ Lung Ca at 76, pAunt w/ Breast at 36, mUncle w/ Kidney Ca at 76, and mGrandma w/ Colon Ca at 88. \par \par \par

## 2021-10-18 NOTE — DATA REVIEWED
[FreeTextEntry1] : 8/16/21, ZP, Bilat dMMG: scattered FG density, no susp findings, R subareolar 4mm nodular density, bilat benign breast calcs noted, L u/s bx rec, BR4\par 8/16/21, ZP, L U/S: 2:00 6N 1.1cm well-circumscribed hypoechoic nodule, RA duct ectasia containing a 7mm isoechoic soft tissue nodule, L u/s bx, BR4\par 8/17/21, ZP, R U/S: SA nodule may rep focal duct ectasia, rec R mmg and u/s in 6 mos, BR3\par 9/2/21, L U/S bx 12mm nodule 2:00 PATH: Focal stromal fibrosis, benign and concordant, clinical f/u is rec for bloody nipple discharge\par L U/S bx 6mm RA nodule PATH: Duct ectasia w/ periductal fibrosis, benign and concordant, clinical f/u is rec for bloody nipple discharge\par 10/6/21, [facility], MRI: bilaterally - multiple mildly dilated RA ducts containing most c/w inspissated/proteinaceous debris within the ducts, few scattered enhancing nonspecific foci bilat, no susp enhancement bilat, L CW pacemaker, no axillary or internal mammary lymphadenopathy, no MRI evidence of malignancy, further mgmt for the pt's L bloody d/c must be on a clinical basis, rec annual mmg on schedule, BR2.

## 2021-10-22 ENCOUNTER — ASOB RESULT (OUTPATIENT)
Age: 81
End: 2021-10-22

## 2021-10-22 ENCOUNTER — APPOINTMENT (OUTPATIENT)
Dept: OBGYN | Facility: CLINIC | Age: 81
End: 2021-10-22
Payer: MEDICARE

## 2021-10-22 VITALS
SYSTOLIC BLOOD PRESSURE: 118 MMHG | BODY MASS INDEX: 25.11 KG/M2 | WEIGHT: 160 LBS | DIASTOLIC BLOOD PRESSURE: 74 MMHG | HEIGHT: 67 IN

## 2021-10-22 DIAGNOSIS — N83.202 UNSPECIFIED OVARIAN CYST, LEFT SIDE: ICD-10-CM

## 2021-10-22 PROCEDURE — 76830 TRANSVAGINAL US NON-OB: CPT

## 2021-10-22 PROCEDURE — 99212 OFFICE O/P EST SF 10 MIN: CPT

## 2021-10-22 PROCEDURE — 76856 US EXAM PELVIC COMPLETE: CPT | Mod: 59

## 2021-10-29 ENCOUNTER — NON-APPOINTMENT (OUTPATIENT)
Age: 81
End: 2021-10-29

## 2021-10-29 LAB
CA 125 (LABCORP): 14.7 U/ML
EER MALIGNANCY ASSESSMENT, OVA1 PLUS: NORMAL
HE4X: 91 PMOL/L
MALIGNANCY ASSESSMENT, CA 125 II: 15
MALIGNANCY ASSESSMENT, MENOPAUSAL STATUS: NORMAL
MALIGNANCY ASSESSMENT, OVA1 SCORE: 4.4
MALIGNANCY ASSESSMENT, OVERA SCORE: 4.3
POSTMENOPAUSAL ROMA: 1.93
PREMENOPAUSAL ROMA: 2.51
ROMA COMMENT: NORMAL

## 2021-10-29 NOTE — DISCUSSION/SUMMARY
[FreeTextEntry1] : 80yo  with ductal ectasia and BRCA2 + followed by Dr Rudd with 2 L ov cysts. \par \par L ov cysts: \par - JOLENE and Ova 1 sent today \par - Discussed BSO given BRCA 2+ status.  We discussed the R/B/A of the procedure including but not limited to bleeding, infection incomplete/need for repeat/need for further procedure, injury to surrounding organs (bowel, bladder, uterus, tubes, ovaries, cervix), risk associated with anesthesia. Pt to obtain pre-operative surgical clearance from her PCP ASAP. We discussed post op care, expectations  and precautions as well.\par - Will coordinate with Dr Rudd re: surgical timing. \par \par Ann Camacho MD \par Obstetrician/Gynecologist\par

## 2021-10-29 NOTE — HISTORY OF PRESENT ILLNESS
[FreeTextEntry1] : 82yo  with ductal ectasia and BRCA2 + followed by Dr Rudd is here for GYN intake. She had a sono today which demonstrates a 3.2 mm EMS, a 1cm CARLYN fibroid and 2 L ov cysts (2 x 3cm & 2 x 1 x 1cm).

## 2021-11-24 ENCOUNTER — APPOINTMENT (OUTPATIENT)
Dept: RADIOLOGY | Facility: CLINIC | Age: 81
End: 2021-11-24

## 2021-11-30 ENCOUNTER — OUTPATIENT (OUTPATIENT)
Dept: OUTPATIENT SERVICES | Facility: HOSPITAL | Age: 81
LOS: 1 days | End: 2021-11-30
Payer: MEDICARE

## 2021-11-30 PROCEDURE — 93010 ELECTROCARDIOGRAM REPORT: CPT

## 2021-11-30 PROCEDURE — 71046 X-RAY EXAM CHEST 2 VIEWS: CPT | Mod: 26

## 2021-12-05 DIAGNOSIS — Z01.818 ENCOUNTER FOR OTHER PREPROCEDURAL EXAMINATION: ICD-10-CM

## 2021-12-11 ENCOUNTER — APPOINTMENT (OUTPATIENT)
Dept: DISASTER EMERGENCY | Facility: CLINIC | Age: 81
End: 2021-12-11

## 2021-12-12 LAB — SARS-COV-2 N GENE NPH QL NAA+PROBE: NOT DETECTED

## 2021-12-14 ENCOUNTER — APPOINTMENT (OUTPATIENT)
Dept: OBGYN | Facility: HOSPITAL | Age: 81
End: 2021-12-14

## 2021-12-14 ENCOUNTER — OUTPATIENT (OUTPATIENT)
Dept: OUTPATIENT SERVICES | Facility: HOSPITAL | Age: 81
LOS: 1 days | End: 2021-12-14
Payer: MEDICARE

## 2021-12-14 ENCOUNTER — APPOINTMENT (OUTPATIENT)
Dept: BREAST CENTER | Facility: HOSPITAL | Age: 81
End: 2021-12-14

## 2021-12-14 PROCEDURE — 19110 NIPPLE EXPLORATION: CPT

## 2021-12-14 PROCEDURE — 58661 LAPAROSCOPY REMOVE ADNEXA: CPT

## 2021-12-14 PROCEDURE — 58661 LAPAROSCOPY REMOVE ADNEXA: CPT | Mod: 80

## 2021-12-22 ENCOUNTER — APPOINTMENT (OUTPATIENT)
Dept: BREAST CENTER | Facility: CLINIC | Age: 81
End: 2021-12-22
Payer: MEDICARE

## 2021-12-22 VITALS
OXYGEN SATURATION: 95 % | TEMPERATURE: 97.9 F | HEART RATE: 80 BPM | DIASTOLIC BLOOD PRESSURE: 86 MMHG | WEIGHT: 165.01 LBS | BODY MASS INDEX: 25.84 KG/M2 | SYSTOLIC BLOOD PRESSURE: 139 MMHG

## 2021-12-22 DIAGNOSIS — N64.52 NIPPLE DISCHARGE: ICD-10-CM

## 2021-12-22 PROCEDURE — 99024 POSTOP FOLLOW-UP VISIT: CPT

## 2021-12-22 NOTE — HISTORY OF PRESENT ILLNESS
[FreeTextEntry1] : Pt is a 81 year old white BRCA2+ female here post op 12/14/21 L NAC exploration and excision coordinated w/ Dr. Duran for BSO, after biopsies and MRI for L breast pink/bloody dc. \par Doing well postop. \par 12/14/21 path is still pending. \par Here with her , Mushtaq, who is scheduled for surgery 1/20, Watchman's procedure. \par Referred by Wero Figueroa MD.\par \par 8/16/21, [facility], Bilat dMMG: scattered FG density, no susp findings, R subareolar 4mm nodular density, bilat benign breast calcs noted, L u/s bx rec, BR4\par 8/16/21, [facility], L U/S: 2:00 6N 1.1cm well-circumscribed hypoechoic nodule, RA duct ectasia containing a 7mm isoechoic soft tissue nodule, L u/s bx, BR4\par 8/17/21, [facility], R U/S: SA nodule may rep focal duct ectasia, rec R mmg and u/s in 6 mos, BR3\par 9/2/21, L U/S bx 12mm nodule 2:00 PATH: Focal stromal fibrosis, benign and concordant, clinical f/u is rec for bloody nipple discharge\par L U/S bx 6mm RA nodule PATH: Duct ectasia w/ periductal fibrosis, benign and concordant, clinical f/u is rec for bloody nipple discharge\par 10/6/21, [facility], MRI: bilaterally - multiple mildly dilated RA ducts containing most c/w inspissated/proteinaceous debris within the ducts, few scattered enhancing nonspecific foci bilat, no susp enhancement bilat, L CW pacemaker, no axillary or internal mammary lymphadenopathy, no MRI evidence of malignancy, further mgmt for the pt's L bloody d/c must be on a clinical basis, rec annual mmg on schedule, BR2.\par \par Fhx: mAunt w/ Lung Ca at 76, pAunt w/ Breast at 36, mUncle w/ Kidney Ca at 76, and mGrandma w/ Colon Ca at 88. \par

## 2021-12-22 NOTE — ASSESSMENT
[FreeTextEntry1] : Pt is a 81 year old white BRCA2+ female here post op 12/14/21 L NAC exploration and excision coordinated w/ Dr. Duran for BSO, after biopsies and MRI for L breast pink/bloody dc. \par Doing well postop. \par 12/14/21 path is still pending. \par Here with her , Mushtaq, who is scheduled for surgery 1/20, Watchman's procedure. \par Referred by Wero Figueroa MD.\par \par 8/16/21, ZP, Bilat dMMG: scattered FG density, no susp findings, R subareolar 4mm nodular density, bilat benign breast calcs noted, L u/s bx rec, BR4\par 8/16/21, ZP, L U/S: 2:00 6N 1.1cm well-circumscribed hypoechoic nodule, RA duct ectasia containing a 7mm isoechoic soft tissue nodule, L u/s bx, BR4\par 8/17/21, ZP, R U/S: SA nodule may rep focal duct ectasia, rec R mmg and u/s in 6 mos, BR3\par 9/2/21, L U/S bx 12mm nodule 2:00 PATH: Focal stromal fibrosis, benign and concordant, clinical f/u is rec for bloody nipple discharge\par L U/S bx 6mm RA nodule PATH: Duct ectasia w/ periductal fibrosis, benign and concordant, clinical f/u is rec for bloody nipple discharge\par 10/6/21, [facility], MRI: bilaterally - multiple mildly dilated RA ducts containing most c/w inspissated/proteinaceous debris within the ducts, few scattered enhancing nonspecific foci bilat, no susp enhancement bilat, L CW pacemaker, no axillary or internal mammary lymphadenopathy, no MRI evidence of malignancy, further mgmt for the pt's L bloody d/c must be on a clinical basis, rec annual mmg on schedule, BR2.\par \par Fhx: mAunt w/ Lung Ca at 76, pAunt w/ Breast at 36, mUncle w/ Kidney Ca at 76, and mGrandma w/ Colon Ca at 88. \par \par CBE: Left PA inc intact and healing.\par Discussed path is still pending. Management and f.u pending results. Will contact pt next week with results. If neg, then f.u 6 weeks, if abnl then as indicated.

## 2022-01-06 ENCOUNTER — APPOINTMENT (OUTPATIENT)
Dept: OBGYN | Facility: CLINIC | Age: 82
End: 2022-01-06
Payer: MEDICARE

## 2022-01-06 VITALS
BODY MASS INDEX: 25.11 KG/M2 | WEIGHT: 160 LBS | HEIGHT: 67 IN | SYSTOLIC BLOOD PRESSURE: 118 MMHG | DIASTOLIC BLOOD PRESSURE: 72 MMHG

## 2022-01-06 PROCEDURE — 99024 POSTOP FOLLOW-UP VISIT: CPT

## 2022-01-06 NOTE — HISTORY OF PRESENT ILLNESS
[FreeTextEntry1] : 80yo BRCA 2+ F s/p EUA, laparoscopic BSO with benign results is here for follow up.  She has had no issues since her surgery and feels great!

## 2022-01-06 NOTE — PHYSICAL EXAM
[FreeTextEntry7] : incisions are well healed  [Labia Majora] : normal [Labia Minora] : normal [Normal] : normal [Uterine Adnexae] : normal

## 2022-01-06 NOTE — DISCUSSION/SUMMARY
[FreeTextEntry1] : 80yo BRCA 2+ F s/p EUA, laparoscopic BSO with benign results \par \par - Pt ok to resume all life activities \par - Continue follow up with Dr Rudd \par - RTO in Sept for annual exam \par \par Ann Camacho MD \par Obstetrician/Gynecologist\par

## 2022-01-27 ENCOUNTER — NON-APPOINTMENT (OUTPATIENT)
Age: 82
End: 2022-01-27

## 2022-02-01 ENCOUNTER — APPOINTMENT (OUTPATIENT)
Dept: MAMMOGRAPHY | Facility: CLINIC | Age: 82
End: 2022-02-01

## 2022-02-01 ENCOUNTER — APPOINTMENT (OUTPATIENT)
Dept: ULTRASOUND IMAGING | Facility: CLINIC | Age: 82
End: 2022-02-01

## 2022-06-01 ENCOUNTER — RESULT REVIEW (OUTPATIENT)
Age: 82
End: 2022-06-01

## 2022-06-23 ENCOUNTER — APPOINTMENT (OUTPATIENT)
Dept: MAMMOGRAPHY | Facility: CLINIC | Age: 82
End: 2022-06-23

## 2022-06-23 ENCOUNTER — RESULT REVIEW (OUTPATIENT)
Age: 82
End: 2022-06-23

## 2022-06-23 ENCOUNTER — APPOINTMENT (OUTPATIENT)
Dept: ULTRASOUND IMAGING | Facility: CLINIC | Age: 82
End: 2022-06-23

## 2022-06-23 PROCEDURE — 77063 BREAST TOMOSYNTHESIS BI: CPT

## 2022-06-23 PROCEDURE — 77067 SCR MAMMO BI INCL CAD: CPT

## 2022-06-23 PROCEDURE — 76641 ULTRASOUND BREAST COMPLETE: CPT | Mod: 50

## 2022-07-01 ENCOUNTER — NON-APPOINTMENT (OUTPATIENT)
Age: 82
End: 2022-07-01

## 2022-07-14 ENCOUNTER — OUTPATIENT (OUTPATIENT)
Dept: OUTPATIENT SERVICES | Facility: HOSPITAL | Age: 82
LOS: 1 days | End: 2022-07-14
Payer: MEDICARE

## 2022-07-14 DIAGNOSIS — Z13.820 ENCOUNTER FOR SCREENING FOR OSTEOPOROSIS: ICD-10-CM

## 2022-07-14 DIAGNOSIS — Z12.39 ENCOUNTER FOR OTHER SCREENING FOR MALIGNANT NEOPLASM OF BREAST: ICD-10-CM

## 2022-07-14 PROCEDURE — A9579: CPT

## 2022-07-14 PROCEDURE — 93286 PERI-PX EVAL PM/LDLS PM IP: CPT | Mod: 26,59

## 2022-07-14 PROCEDURE — 71046 X-RAY EXAM CHEST 2 VIEWS: CPT | Mod: 26

## 2022-07-14 PROCEDURE — 71046 X-RAY EXAM CHEST 2 VIEWS: CPT

## 2022-07-14 PROCEDURE — 74183 MRI ABD W/O CNTR FLWD CNTR: CPT

## 2022-07-14 PROCEDURE — 74183 MRI ABD W/O CNTR FLWD CNTR: CPT | Mod: 26

## 2022-07-15 DIAGNOSIS — Z12.39 ENCOUNTER FOR OTHER SCREENING FOR MALIGNANT NEOPLASM OF BREAST: ICD-10-CM

## 2022-07-15 DIAGNOSIS — Z13.820 ENCOUNTER FOR SCREENING FOR OSTEOPOROSIS: ICD-10-CM

## 2022-07-22 ENCOUNTER — APPOINTMENT (OUTPATIENT)
Dept: BREAST CENTER | Facility: CLINIC | Age: 82
End: 2022-07-22

## 2022-08-29 ENCOUNTER — APPOINTMENT (OUTPATIENT)
Dept: OPHTHALMOLOGY | Facility: CLINIC | Age: 82
End: 2022-08-29
Payer: SELF-PAY

## 2022-08-29 ENCOUNTER — NON-APPOINTMENT (OUTPATIENT)
Age: 82
End: 2022-08-29

## 2022-08-29 ENCOUNTER — APPOINTMENT (OUTPATIENT)
Dept: OPHTHALMOLOGY | Facility: CLINIC | Age: 82
End: 2022-08-29

## 2022-08-29 PROCEDURE — ZZZZZ: CPT

## 2022-08-29 PROCEDURE — 92004 COMPRE OPH EXAM NEW PT 1/>: CPT

## 2022-08-29 PROCEDURE — 92015 DETERMINE REFRACTIVE STATE: CPT

## 2022-09-22 ENCOUNTER — APPOINTMENT (OUTPATIENT)
Dept: OBGYN | Facility: CLINIC | Age: 82
End: 2022-09-22

## 2022-09-22 VITALS
DIASTOLIC BLOOD PRESSURE: 78 MMHG | SYSTOLIC BLOOD PRESSURE: 124 MMHG | BODY MASS INDEX: 25.11 KG/M2 | WEIGHT: 160 LBS | HEIGHT: 67 IN

## 2022-09-22 DIAGNOSIS — Z01.419 ENCOUNTER FOR GYNECOLOGICAL EXAMINATION (GENERAL) (ROUTINE) W/OUT ABNORMAL FINDINGS: ICD-10-CM

## 2022-09-22 DIAGNOSIS — R30.0 DYSURIA: ICD-10-CM

## 2022-09-22 LAB
BILIRUB UR QL STRIP: NORMAL
CLARITY UR: CLEAR
COLLECTION METHOD: NORMAL
GLUCOSE UR-MCNC: NORMAL
HCG UR QL: 0.2 EU/DL
HGB UR QL STRIP.AUTO: NORMAL
KETONES UR-MCNC: NORMAL
LEUKOCYTE ESTERASE UR QL STRIP: NORMAL
NITRITE UR QL STRIP: NORMAL
PH UR STRIP: 6.5
PROT UR STRIP-MCNC: NORMAL
SP GR UR STRIP: 1.01

## 2022-09-22 PROCEDURE — 99213 OFFICE O/P EST LOW 20 MIN: CPT | Mod: 25

## 2022-09-22 PROCEDURE — 81003 URINALYSIS AUTO W/O SCOPE: CPT | Mod: QW

## 2022-09-22 PROCEDURE — P9615: CPT

## 2022-09-22 PROCEDURE — 99397 PER PM REEVAL EST PAT 65+ YR: CPT

## 2022-09-22 NOTE — DISCUSSION/SUMMARY
[FreeTextEntry1] : 82 year old PMF here for wwe, r.o uti, brca2+ s/p bso nl exam\par - can discontinue cervical cancer screening\par - sbe reveiwed, Mammo/sono due next 6/2023, due for breast MRI 12/2022 - will call Dr. Rudd's office\par - Dexa RX given, vitamin D and calcium, weight bearing exercises recommended\par - colonoscopy referral\par - n/v 1 year for annual or prn\par - postmenopausal bleeding precautions given\par

## 2022-09-22 NOTE — PHYSICAL EXAM
[Appropriately responsive] : appropriately responsive [Alert] : alert [No Acute Distress] : no acute distress [No Lymphadenopathy] : no lymphadenopathy [Regular Rate Rhythm] : regular rate rhythm [Soft] : soft [Non-tender] : non-tender [Non-distended] : non-distended [No HSM] : No HSM [No Lesions] : no lesions [No Mass] : no mass [Oriented x3] : oriented x3 [Examination Of The Breasts] : a normal appearance [No Masses] : no breast masses were palpable [Labia Majora] : normal [Labia Minora] : normal [Normal] : normal [Atrophy] : atrophy [Uterine Adnexae] : absent

## 2022-09-22 NOTE — HISTORY OF PRESENT ILLNESS
[FreeTextEntry1] : LOAN WADE is a 82 year PMF   here for annual. Not sexually active. She reports some painful urination, has had recurrent utis in the past - but not recently. She takes D-mannose and cranberry pills. \par Denies vaginal bleeding, discharge or pain. Patient is BRCA2+ s/p lap BSO in . She follows with Dr. Rudd.\par \par Gynhx: LMP 45 yo. h/o Reg menses, No h/o STIs/fibroids/cysts/abn paps,s/p lap bso for BRCA2\par Obhx: x2, sab \par \par Last mammo: 2022 w/ sono- birads 2, due for MRI 2022 - encouraged to follow up with Dr. Rudd\par Last Colonoscopy: 2019\par Last Pap smear: 2021\par Last dexa: years ago - reports h/o osteoporosis, has been on bisphosphonate before.\par

## 2022-09-28 DIAGNOSIS — N39.0 URINARY TRACT INFECTION, SITE NOT SPECIFIED: ICD-10-CM

## 2022-09-28 NOTE — HISTORY OF PRESENT ILLNESS
[FreeTextEntry1] : Pt is a 82 year old white BRCA2+ female here for f/u, hx 12/14/21 L NAC exploration and excision coordinated w/ Dr. Duran for BSO, after biopsies and MRI for L breast pink/bloody dc. \par \par 12/14/21 Surgical PATH: Duct ectasia, FCD w/ adjacent breast tissue, Focal PASH, microcalcs of the luminal contents, negative for malignancy\par \par Here with her , Mushtaq, who is scheduled for surgery 1/20, Watchman's procedure. \par Referred by Wero Figueroa MD.\par \par 8/16/21, ZP, Bilat dMMG: scattered FG density, no susp findings, R subareolar 4mm nodular density, bilat benign breast calcs noted, L u/s bx rec, BR4\par 8/16/21, ZP, L U/S: 2:00 6N 1.1cm well-circumscribed hypoechoic nodule, RA duct ectasia containing a 7mm isoechoic soft tissue nodule, L u/s bx, BR4\par 8/17/21, ZP, R U/S: SA nodule may rep focal duct ectasia, rec R mmg and u/s in 6 mos, BR3\par 9/2/21, L U/S bx 12mm nodule 2:00 PATH: Focal stromal fibrosis, benign and concordant, clinical f/u is rec for bloody nipple discharge\par L U/S bx 6mm RA nodule PATH: Duct ectasia w/ periductal fibrosis, benign and concordant, clinical f/u is rec for bloody nipple discharge\par 10/6/21, [facility], MRI: bilaterally - multiple mildly dilated RA ducts containing most c/w inspissated/proteinaceous debris within the ducts, few scattered enhancing nonspecific foci bilat, no susp enhancement bilat, L CW pacemaker, no axillary or internal mammary lymphadenopathy, no MRI evidence of malignancy, further mgmt for the pt's L bloody d/c must be on a clinical basis, rec annual mmg on schedule, BR2.\par 6/23/22, [facility], Bilat sMMG: FG, no susp findings, postsurg scarring RA region in L breast; Bilat U/S; R no susp solid mass, L no susp solid mass, postsurg scarring RA region, prev RA nodularity not see on current study, rec mmg 1 year, BR2.\par \par Fhx: mAunt w/ Lung Ca at 76, pAunt w/ Breast at 36, mUncle w/ Kidney Ca at 76, and mGrandma w/ Colon Ca at 88. \par \par CBE: Left PA inc intact and healing.\par  \par Plan:\par

## 2022-10-03 LAB — BACTERIA UR CULT: ABNORMAL

## 2022-10-04 ENCOUNTER — APPOINTMENT (OUTPATIENT)
Dept: BREAST CENTER | Facility: CLINIC | Age: 82
End: 2022-10-04

## 2022-10-11 LAB — BACTERIA UR CULT: ABNORMAL

## 2022-10-26 ENCOUNTER — NON-APPOINTMENT (OUTPATIENT)
Age: 82
End: 2022-10-26

## 2022-10-26 LAB — BACTERIA UR CULT: NORMAL

## 2022-12-15 ENCOUNTER — RESULT REVIEW (OUTPATIENT)
Age: 82
End: 2022-12-15

## 2023-01-09 ENCOUNTER — APPOINTMENT (OUTPATIENT)
Dept: BREAST CENTER | Facility: CLINIC | Age: 83
End: 2023-01-09
Payer: MEDICARE

## 2023-01-09 VITALS
TEMPERATURE: 97.3 F | SYSTOLIC BLOOD PRESSURE: 127 MMHG | HEART RATE: 84 BPM | BODY MASS INDEX: 25.11 KG/M2 | HEIGHT: 67 IN | DIASTOLIC BLOOD PRESSURE: 77 MMHG | WEIGHT: 160 LBS

## 2023-01-09 DIAGNOSIS — Z15.09 GENETIC SUSCEPTIBILITY TO MALIGNANT NEOPLASM OF BREAST: ICD-10-CM

## 2023-01-09 DIAGNOSIS — Z15.01 GENETIC SUSCEPTIBILITY TO MALIGNANT NEOPLASM OF BREAST: ICD-10-CM

## 2023-01-09 DIAGNOSIS — Z00.00 ENCOUNTER FOR GENERAL ADULT MEDICAL EXAMINATION W/OUT ABNORMAL FINDINGS: ICD-10-CM

## 2023-01-09 DIAGNOSIS — N60.19 DIFFUSE CYSTIC MASTOPATHY OF UNSPECIFIED BREAST: ICD-10-CM

## 2023-01-09 PROCEDURE — 99213 OFFICE O/P EST LOW 20 MIN: CPT

## 2023-01-09 RX ORDER — CEPHALEXIN 500 MG/1
500 CAPSULE ORAL 3 TIMES DAILY
Qty: 21 | Refills: 0 | Status: DISCONTINUED | COMMUNITY
Start: 2022-10-11 | End: 2023-01-09

## 2023-01-09 RX ORDER — NITROFURANTOIN (MONOHYDRATE/MACROCRYSTALS) 25; 75 MG/1; MG/1
100 CAPSULE ORAL
Qty: 14 | Refills: 0 | Status: DISCONTINUED | COMMUNITY
Start: 2022-09-28 | End: 2023-01-09

## 2023-01-09 NOTE — HISTORY OF PRESENT ILLNESS
[FreeTextEntry1] : Pt is a 82 year old white BRCA2+ female here for follow up. She is s/p 12/14/21 L NAC exploration and excision coordinated w/ Dr. Duran for BSO, after biopsies and MRI for L breast pink/bloody dc. \par 12/14/21 Surgical path: Duct ectasia, FCD w/ adjacent breast tissue, Focal PASH, microcalcs of the luminal contents, negative for malignancy.\par \par She is here alone today, she states her  has been having some health issues and was hospitalized at [facility] last month, she plans on going down to [place] for the winter if her  is able to. \par \par Referred by Wero Figueroa MD.\par \par 8/16/21, ZP, Bilat dMMG: scattered FG density, no susp findings, R subareolar 4mm nodular density, bilat benign breast calcs noted, L u/s bx rec, BR4\par 8/16/21, ZP, L U/S: 2:00 6N 1.1cm well-circumscribed hypoechoic nodule, RA duct ectasia containing a 7mm isoechoic soft tissue nodule, L u/s bx, BR4\par 8/17/21, ZP, R U/S: SA nodule may rep focal duct ectasia, rec R mmg and u/s in 6 mos, BR3\par 9/2/21, L U/S bx 12mm nodule 2:00 PATH: Focal stromal fibrosis, benign and concordant, clinical f/u is rec for bloody nipple discharge\par L U/S bx 6mm RA nodule PATH: Duct ectasia w/ periductal fibrosis, benign and concordant, clinical f/u is rec for bloody nipple discharge\par 10/6/21, [facility], MRI: bilaterally - multiple mildly dilated RA ducts containing most c/w inspissated/proteinaceous debris within the ducts, few scattered enhancing nonspecific foci bilat, no susp enhancement bilat, L CW pacemaker, no axillary or internal mammary lymphadenopathy, no MRI evidence of malignancy, further mgmt for the pt's L bloody d/c must be on a clinical basis, rec annual mmg on schedule, BR2.\par \par 6/23/22 [facility] Bilat sMMG and US: FG dense. No susp mass, microcals or other sign of malignancy is identified. Postsurg scarring is seen in the RA region of L breast. US: R- no susp solid mass. L- No susp solid mass. Postsurg scarring is seen in the RA region. Previous RA nodularity is not seen on current study. Rec MMG in 1 yr. BR2 \par \par 12/15/22 [facility], MRI: FG. Mild and symmetric background parenchymal enhancement. L- post surg changes are present. RA oval circumscribed mass measuring 1.1 x 0.5cm with homogenous enhancement, representing fat necrosis. Chest wall cardiac device obscures upper L breast at posterior depth. No susp enhancement. R- no susp enhancement. No R or L axillary lymphadenopathy. Rec annual screening with US 6/23. BR2\par \par Fhx: mAunt w/ Lung Ca at 76, pAunt w/ Breast at 36, mUncle w/ Kidney Ca at 76, and mGrandma w/ Colon Ca at 88. \par

## 2023-01-09 NOTE — ASSESSMENT
[FreeTextEntry1] : Pt is a 82 year old white BRCA2+ female here for follow up. She is s/p 12/14/21 L NAC exploration and excision coordinated w/ Dr. Duran for BSO, after biopsies and MRI for L breast pink/bloody dc. \par 12/14/21 Surgical path: Duct ectasia, FCD w/ adjacent breast tissue, Focal PASH, microcalcs of the luminal contents, negative for malignancy.\par \par She is here alone today, she states her  has been having some health issues and was [facility] month, she plans on going down to [place] for the winter if her  is able to. \par \par Referred by Wero Figueroa MD.\par \par 8/16/21, [facility], Bilat dMMG: scattered FG density, no susp findings, R subareolar 4mm nodular density, bilat benign breast calcs noted, L u/s bx rec, BR4\par 8/16/21, Z[facility], L U/S: 2:00 6N 1.1cm well-circumscribed hypoechoic nodule, RA duct ectasia containing a 7mm isoechoic soft tissue nodule, L u/s bx, BR4\par 8/17/21, ZP, R U/S: SA nodule may rep focal duct ectasia, rec R mmg and u/s in 6 mos, BR3\par 9/2/21, L U/S bx 12mm nodule 2:00 PATH: Focal stromal fibrosis, benign and concordant, clinical f/u is rec for bloody nipple discharge\par L U/S bx 6mm RA nodule PATH: Duct ectasia w/ periductal fibrosis, benign and concordant, clinical f/u is rec for bloody nipple discharge\par 10/6/21, [facility], MRI: bilaterally - multiple mildly dilated RA ducts containing most c/w inspissated/proteinaceous debris within the ducts, few scattered enhancing nonspecific foci bilat, no susp enhancement bilat, L CW pacemaker, no axillary or internal mammary lymphadenopathy, no MRI evidence of malignancy, further mgmt for the pt's L bloody d/c must be on a clinical basis, rec annual mmg on schedule, BR2.\par \par 6/23/22 [facility] Bilat sMMG and US: FG dense. No susp mass, microcals or other sign of malignancy is identified. Postsurg scarring is seen in the RA region of L breast. US: R- no susp solid mass. L- No susp solid mass. Postsurg scarring is seen in the RA region. Previous RA nodularity is not seen on current study. Rec MMG in 1 yr. BR2 \par \par 12/15/22 [facility], MRI: FG. Mild and symmetric background parenchymal enhancement. L- post surg changes are present. RA oval circumscribed mass measuring 1.1 x 0.5cm with homogenous enhancement, representing fat necrosis. Chest wall cardiac device obscures upper L breast at posterior depth. No susp enhancement. R- no susp enhancement. No R or L axillary lymphadenopathy. Rec annual screening with US 6/23. BR2\par \par Fhx: mAunt w/ Lung Ca at 76, pAunt w/ Breast at 36, mUncle w/ Kidney Ca at 76, and mGrandma w/ Colon Ca at 88. \par CBE: GABRIELA. Right neg, Left PA well healed scar, left NAC is slightly flatter since her surgery. No suspicious masses or lesions. No axillary or SC lymphadenopathy. \par Reviewed MRI with pt. Rec is for Bilat mmg and u/s and then CBE with PCP/GYN. MRI 12/23 and then f.u. with NP for HRP.

## 2023-01-09 NOTE — DATA REVIEWED
[FreeTextEntry1] : 6/23/22 [facility], Bilat sMMG and US: FG dense. No susp mass, microcals or other sign of malignancy is identified. Postsurg scarring is seen in the RA region of L breast. US: R- no susp solid mass. L- No susp solid mass. Postsurg scarring is seen in the RA region. Previous RA nodularity is not seen on current study. Rec MMG in 1 yr. BR2 \par \par 12/15/22 [facility], MRI: FG. Mild and symmetric background parenchymal enhancement. L- post surg changes are present. RA oval circumscribed mass measuring 1.1 x 0.5cm with homogenous enhancement, representing fat necrosis. Chest wall cardiac device obscures upper L breast at posterior depth. No susp enhancement. R- no susp enhancement. No R or L axillary lymphadenopathy. Rec annual screening with US 6/23. BR2\par

## 2023-01-09 NOTE — PHYSICAL EXAM
[Normocephalic] : normocephalic [Atraumatic] : atraumatic [Supple] : supple [No Supraclavicular Adenopathy] : no supraclavicular adenopathy [Examined in the supine and seated position] : examined in the supine and seated position [No dominant masses] : no dominant masses in right breast  [No dominant masses] : no dominant masses left breast [No Nipple Retraction] : no left nipple retraction [No Nipple Discharge] : no left nipple discharge [No Axillary Lymphadenopathy] : no left axillary lymphadenopathy [No Edema] : no edema [No Swelling] : no swelling [Full ROM] : full range of motion [No Rashes] : no rashes [No Ulceration] : no ulceration [de-identified] : Bilat breast slightly FCC.  [de-identified] : PA well healed scar.

## 2023-06-26 ENCOUNTER — RESULT REVIEW (OUTPATIENT)
Age: 83
End: 2023-06-26

## 2023-06-26 ENCOUNTER — APPOINTMENT (OUTPATIENT)
Dept: MAMMOGRAPHY | Facility: CLINIC | Age: 83
End: 2023-06-26
Payer: MEDICARE

## 2023-06-26 ENCOUNTER — APPOINTMENT (OUTPATIENT)
Dept: ULTRASOUND IMAGING | Facility: CLINIC | Age: 83
End: 2023-06-26

## 2023-06-26 PROCEDURE — 77063 BREAST TOMOSYNTHESIS BI: CPT

## 2023-06-26 PROCEDURE — 76641 ULTRASOUND BREAST COMPLETE: CPT | Mod: 50

## 2023-06-26 PROCEDURE — 77067 SCR MAMMO BI INCL CAD: CPT

## 2023-07-05 ENCOUNTER — NON-APPOINTMENT (OUTPATIENT)
Age: 83
End: 2023-07-05

## 2023-08-24 ENCOUNTER — APPOINTMENT (OUTPATIENT)
Dept: ULTRASOUND IMAGING | Facility: CLINIC | Age: 83
End: 2023-08-24

## 2023-09-06 ENCOUNTER — APPOINTMENT (OUTPATIENT)
Dept: ULTRASOUND IMAGING | Facility: CLINIC | Age: 83
End: 2023-09-06
Payer: MEDICARE

## 2023-09-06 PROCEDURE — 20611 DRAIN/INJ JOINT/BURSA W/US: CPT | Mod: RT

## 2023-11-02 ENCOUNTER — APPOINTMENT (OUTPATIENT)
Dept: OBGYN | Facility: CLINIC | Age: 83
End: 2023-11-02
Payer: MEDICARE

## 2023-11-02 VITALS
SYSTOLIC BLOOD PRESSURE: 126 MMHG | BODY MASS INDEX: 25.11 KG/M2 | DIASTOLIC BLOOD PRESSURE: 72 MMHG | WEIGHT: 160 LBS | HEIGHT: 67 IN

## 2023-11-02 LAB
DATE COLLECTED: NORMAL
HEMOCCULT SP1 STL QL: NEGATIVE
QUALITY CONTROL: YES

## 2023-11-02 PROCEDURE — 99397 PER PM REEVAL EST PAT 65+ YR: CPT

## 2023-11-02 PROCEDURE — 82270 OCCULT BLOOD FECES: CPT

## 2023-11-02 RX ORDER — NAPROXEN 250 MG/1
250 TABLET ORAL
Qty: 60 | Refills: 0 | Status: COMPLETED | COMMUNITY
Start: 2021-09-03 | End: 2023-11-02

## 2023-11-02 RX ORDER — DICYCLOMINE HYDROCHLORIDE 10 MG/1
10 CAPSULE ORAL
Refills: 0 | Status: COMPLETED | COMMUNITY
End: 2023-11-02

## 2023-11-02 RX ORDER — ASPIRIN 81 MG
81 TABLET, DELAYED RELEASE (ENTERIC COATED) ORAL
Refills: 0 | Status: COMPLETED | COMMUNITY
End: 2023-11-02

## 2023-11-02 RX ORDER — OMEPRAZOLE 20 MG/1
20 CAPSULE, DELAYED RELEASE ORAL
Refills: 0 | Status: COMPLETED | COMMUNITY
End: 2023-11-02

## 2023-11-02 RX ORDER — ESTRADIOL 0.1 MG/G
0.1 CREAM VAGINAL
Refills: 0 | Status: COMPLETED | COMMUNITY
End: 2023-11-02

## 2023-11-17 LAB
CYTOLOGY CVX/VAG DOC THIN PREP: ABNORMAL
HPV HIGH+LOW RISK DNA PNL CVX: NOT DETECTED

## 2023-12-12 ENCOUNTER — APPOINTMENT (OUTPATIENT)
Dept: RADIOLOGY | Facility: CLINIC | Age: 83
End: 2023-12-12
Payer: MEDICARE

## 2023-12-12 PROCEDURE — 77080 DXA BONE DENSITY AXIAL: CPT

## 2023-12-17 ENCOUNTER — NON-APPOINTMENT (OUTPATIENT)
Age: 83
End: 2023-12-17

## 2023-12-22 ENCOUNTER — NON-APPOINTMENT (OUTPATIENT)
Age: 83
End: 2023-12-22

## 2023-12-27 ENCOUNTER — NON-APPOINTMENT (OUTPATIENT)
Age: 83
End: 2023-12-27

## 2024-01-17 ENCOUNTER — NON-APPOINTMENT (OUTPATIENT)
Age: 84
End: 2024-01-17

## 2024-06-28 ENCOUNTER — RESULT REVIEW (OUTPATIENT)
Age: 84
End: 2024-06-28

## 2024-06-28 ENCOUNTER — APPOINTMENT (OUTPATIENT)
Dept: ULTRASOUND IMAGING | Facility: CLINIC | Age: 84
End: 2024-06-28

## 2024-06-28 ENCOUNTER — APPOINTMENT (OUTPATIENT)
Dept: MAMMOGRAPHY | Facility: CLINIC | Age: 84
End: 2024-06-28
Payer: MEDICARE

## 2024-06-28 PROCEDURE — 77063 BREAST TOMOSYNTHESIS BI: CPT

## 2024-06-28 PROCEDURE — 77067 SCR MAMMO BI INCL CAD: CPT

## 2024-06-28 PROCEDURE — 76641 ULTRASOUND BREAST COMPLETE: CPT | Mod: 50

## 2024-07-01 ENCOUNTER — NON-APPOINTMENT (OUTPATIENT)
Age: 84
End: 2024-07-01

## 2024-07-01 ENCOUNTER — RESULT REVIEW (OUTPATIENT)
Age: 84
End: 2024-07-01

## 2024-07-08 ENCOUNTER — APPOINTMENT (OUTPATIENT)
Dept: BREAST CENTER | Facility: CLINIC | Age: 84
End: 2024-07-08
Payer: MEDICARE

## 2024-07-15 ENCOUNTER — APPOINTMENT (OUTPATIENT)
Dept: BREAST CENTER | Facility: CLINIC | Age: 84
End: 2024-07-15
Payer: MEDICARE

## 2024-07-15 VITALS
HEIGHT: 67 IN | TEMPERATURE: 97.7 F | HEART RATE: 75 BPM | BODY MASS INDEX: 24.96 KG/M2 | DIASTOLIC BLOOD PRESSURE: 74 MMHG | WEIGHT: 159 LBS | SYSTOLIC BLOOD PRESSURE: 124 MMHG | OXYGEN SATURATION: 94 %

## 2024-07-15 DIAGNOSIS — Z00.00 ENCOUNTER FOR GENERAL ADULT MEDICAL EXAMINATION W/OUT ABNORMAL FINDINGS: ICD-10-CM

## 2024-07-15 DIAGNOSIS — Z80.3 FAMILY HISTORY OF MALIGNANT NEOPLASM OF BREAST: ICD-10-CM

## 2024-07-15 DIAGNOSIS — Z15.09 GENETIC SUSCEPTIBILITY TO MALIGNANT NEOPLASM OF BREAST: ICD-10-CM

## 2024-07-15 DIAGNOSIS — N60.19 DIFFUSE CYSTIC MASTOPATHY OF UNSPECIFIED BREAST: ICD-10-CM

## 2024-07-15 DIAGNOSIS — Z15.01 GENETIC SUSCEPTIBILITY TO MALIGNANT NEOPLASM OF BREAST: ICD-10-CM

## 2024-07-15 PROCEDURE — 99213 OFFICE O/P EST LOW 20 MIN: CPT

## 2024-08-29 DIAGNOSIS — Z95.0 PRESENCE OF CARDIAC PACEMAKER: ICD-10-CM

## 2024-09-16 ENCOUNTER — NON-APPOINTMENT (OUTPATIENT)
Age: 84
End: 2024-09-16

## 2024-09-16 ENCOUNTER — APPOINTMENT (OUTPATIENT)
Dept: OPHTHALMOLOGY | Facility: CLINIC | Age: 84
End: 2024-09-16
Payer: MEDICARE

## 2024-09-16 PROCEDURE — 92014 COMPRE OPH EXAM EST PT 1/>: CPT

## 2024-09-27 ENCOUNTER — APPOINTMENT (OUTPATIENT)
Dept: ULTRASOUND IMAGING | Facility: CLINIC | Age: 84
End: 2024-09-27
Payer: MEDICARE

## 2024-09-27 PROCEDURE — 76882 US LMTD JT/FCL EVL NVASC XTR: CPT | Mod: RT

## 2024-10-10 ENCOUNTER — APPOINTMENT (OUTPATIENT)
Dept: OPHTHALMOLOGY | Facility: CLINIC | Age: 84
End: 2024-10-10
Payer: MEDICARE

## 2024-10-10 ENCOUNTER — NON-APPOINTMENT (OUTPATIENT)
Age: 84
End: 2024-10-10

## 2024-10-10 PROCEDURE — 92012 INTRM OPH EXAM EST PATIENT: CPT

## 2024-12-17 ENCOUNTER — RESULT REVIEW (OUTPATIENT)
Age: 84
End: 2024-12-17

## 2025-01-02 ENCOUNTER — APPOINTMENT (OUTPATIENT)
Dept: OBGYN | Facility: CLINIC | Age: 85
End: 2025-01-02

## 2025-01-22 ENCOUNTER — NON-APPOINTMENT (OUTPATIENT)
Age: 85
End: 2025-01-22

## 2025-06-30 ENCOUNTER — APPOINTMENT (OUTPATIENT)
Dept: MAMMOGRAPHY | Facility: CLINIC | Age: 85
End: 2025-06-30
Payer: MEDICARE

## 2025-06-30 ENCOUNTER — RESULT REVIEW (OUTPATIENT)
Age: 85
End: 2025-06-30

## 2025-06-30 ENCOUNTER — APPOINTMENT (OUTPATIENT)
Dept: ULTRASOUND IMAGING | Facility: CLINIC | Age: 85
End: 2025-06-30

## 2025-06-30 PROCEDURE — 76641 ULTRASOUND BREAST COMPLETE: CPT | Mod: 50

## 2025-06-30 PROCEDURE — 77063 BREAST TOMOSYNTHESIS BI: CPT

## 2025-06-30 PROCEDURE — 77067 SCR MAMMO BI INCL CAD: CPT

## 2025-07-01 ENCOUNTER — NON-APPOINTMENT (OUTPATIENT)
Age: 85
End: 2025-07-01

## 2025-07-15 ENCOUNTER — APPOINTMENT (OUTPATIENT)
Dept: BREAST CENTER | Facility: CLINIC | Age: 85
End: 2025-07-15
Payer: MEDICARE

## 2025-07-15 VITALS
DIASTOLIC BLOOD PRESSURE: 70 MMHG | WEIGHT: 141 LBS | HEART RATE: 52 BPM | HEIGHT: 67 IN | BODY MASS INDEX: 22.13 KG/M2 | OXYGEN SATURATION: 98 % | SYSTOLIC BLOOD PRESSURE: 126 MMHG | TEMPERATURE: 97.8 F

## 2025-07-15 PROCEDURE — 99214 OFFICE O/P EST MOD 30 MIN: CPT

## 2025-09-11 ENCOUNTER — APPOINTMENT (OUTPATIENT)
Dept: OPHTHALMOLOGY | Facility: CLINIC | Age: 85
End: 2025-09-11